# Patient Record
Sex: FEMALE | Race: WHITE | NOT HISPANIC OR LATINO | Employment: FULL TIME | URBAN - METROPOLITAN AREA
[De-identification: names, ages, dates, MRNs, and addresses within clinical notes are randomized per-mention and may not be internally consistent; named-entity substitution may affect disease eponyms.]

---

## 2024-05-24 ENCOUNTER — OFFICE VISIT (OUTPATIENT)
Age: 54
End: 2024-05-24
Payer: COMMERCIAL

## 2024-05-24 VITALS
DIASTOLIC BLOOD PRESSURE: 72 MMHG | HEIGHT: 63 IN | SYSTOLIC BLOOD PRESSURE: 118 MMHG | BODY MASS INDEX: 21.16 KG/M2 | WEIGHT: 119.4 LBS

## 2024-05-24 DIAGNOSIS — N92.1 MENORRHAGIA WITH IRREGULAR CYCLE: ICD-10-CM

## 2024-05-24 DIAGNOSIS — Z01.419 ENCOUNTER FOR ANNUAL ROUTINE GYNECOLOGICAL EXAMINATION: Primary | ICD-10-CM

## 2024-05-24 DIAGNOSIS — Z12.31 ENCOUNTER FOR SCREENING MAMMOGRAM FOR MALIGNANT NEOPLASM OF BREAST: ICD-10-CM

## 2024-05-24 PROCEDURE — S0610 ANNUAL GYNECOLOGICAL EXAMINA: HCPCS | Performed by: OBSTETRICS & GYNECOLOGY

## 2024-05-24 RX ORDER — LEVOTHYROXINE SODIUM 88 MCG
88 TABLET ORAL DAILY
COMMUNITY

## 2024-05-24 NOTE — PROGRESS NOTES
"Assessment/Plan:    1. Encounter for annual routine gynecological examination      2. Encounter for screening mammogram for malignant neoplasm of breast    - Mammo screening bilateral w 3d & cad; Future    3. Menorrhagia with irregular cycle    - US pelvis complete w transvaginal; Future      Subjective      Lisa Schoenly is a 54 y.o. female who presents for annual exam. Periods are irregular, lasting 2 weeks. She has a h/o CVA due to PFO - off of Plavix.  She is not currently sexually active.  No breast or urinary concerns.      Current contraception: abstinence  History of abnormal Pap smear: no  Regular self breast exam: yes  History of abnormal mammogram: no  History of abnormal lipids: no    Menstrual History:  OB History          2    Para   2    Term   2       0    AB   0    Living   2         SAB   0    IAB   0    Ectopic   0    Multiple   0    Live Births   2                Menarche age: 15  Patient's last menstrual period was 2024 (exact date).  Period Duration (Days): 10-14  Period Pattern: (!) Irregular  Menstrual Flow: Heavy, Light (Heavy x2-3 days with clots)  Menstrual Control: Thin pad, Tampon, Maxi pad  Menstrual Control Change Freq (Hours): 15min - 1 hr  Dysmenorrhea: (!) Moderate  Dysmenorrhea Symptoms: Cramping    Past Medical History:   Diagnosis Date    Hypothyroidism     Migraine     Stroke (HCC) 2022       Family History   Problem Relation Age of Onset    Thyroid disease Mother     No Known Problems Father     Colon cancer Maternal Uncle        The following portions of the patient's history were reviewed and updated as appropriate: allergies, current medications, past family history, past medical history, past social history, past surgical history, and problem list.    Review of Systems  Pertinent items are noted in HPI.      Objective      /72 (BP Location: Right arm, Patient Position: Sitting, Cuff Size: Standard)   Ht 5' 2.5\" (1.588 m)   Wt 54.2 kg (119 " lb 6.4 oz)   LMP 04/29/2024 (Exact Date)   BMI 21.49 kg/m²     General:   alert and oriented, in no acute distress   Heart:  Breasts: regular rate and rhythm  appear normal, no suspicious masses, no skin or nipple changes or axillary nodes.   Lungs: Effort normal   Abdomen: soft, non-tender, without masses or organomegaly   Vulva: normal   Vagina: normal mucosa   Cervix: no lesions   Uterus: normal size, mobile, non-tender   Adnexa: normal adnexa and no mass, fullness, tenderness

## 2024-06-07 ENCOUNTER — HOSPITAL ENCOUNTER (OUTPATIENT)
Dept: ULTRASOUND IMAGING | Facility: HOSPITAL | Age: 54
Discharge: HOME | End: 2024-06-07
Attending: OBSTETRICS & GYNECOLOGY
Payer: COMMERCIAL

## 2024-06-07 DIAGNOSIS — N92.1 MENORRHAGIA WITH IRREGULAR CYCLE: ICD-10-CM

## 2024-06-07 PROCEDURE — 76856 US EXAM PELVIC COMPLETE: CPT

## 2024-06-07 PROCEDURE — 76830 TRANSVAGINAL US NON-OB: CPT

## 2024-06-24 ENCOUNTER — TELEPHONE (OUTPATIENT)
Age: 54
End: 2024-06-24

## 2024-06-24 NOTE — TELEPHONE ENCOUNTER
Patient's call was in queue. She was already scheduled for an appointment to be evaluated.  Patient verbalized understanding and voiced appreciation for phone call.

## 2024-07-02 ENCOUNTER — CONSULT (OUTPATIENT)
Age: 54
End: 2024-07-02
Payer: COMMERCIAL

## 2024-07-02 VITALS — BODY MASS INDEX: 21.42 KG/M2 | DIASTOLIC BLOOD PRESSURE: 60 MMHG | SYSTOLIC BLOOD PRESSURE: 106 MMHG | WEIGHT: 119 LBS

## 2024-07-02 DIAGNOSIS — N92.1 MENORRHAGIA WITH IRREGULAR CYCLE: ICD-10-CM

## 2024-07-02 DIAGNOSIS — R93.5 ABNORMAL ULTRASOUND OF ENDOMETRIUM: Primary | ICD-10-CM

## 2024-07-02 PROCEDURE — 99213 OFFICE O/P EST LOW 20 MIN: CPT | Performed by: OBSTETRICS & GYNECOLOGY

## 2024-07-03 NOTE — PROGRESS NOTES
Gynecology   Lisa Schoenly 54 y.o. female MRN: [id number]      Assessment & Plan :    1. Abnormal ultrasound of endometrium  2. Menorrhagia with irregular cycle    Plan HSC myomectomy vs polypectomy.  Discussed risks of bleeding, infection, and perforation.  Will need preop clearance given stroke history.  Consent signed.      HPI:  Lisa Schoenly is a 54 y.o. female who presents with recent sono showing endometrial lesion consistent with a polyp or fibroid.  She has been experiencing heavy irregular VB for months.        Historical Information   Past Medical History:   Diagnosis Date    Hypothyroidism     Migraine     Stroke (HCC) 2022     Past Surgical History:   Procedure Laterality Date    CHOLECYSTECTOMY      THYROIDECTOMY      partial     OB/GYN History:   OB History          2    Para   2    Term   2       0    AB   0    Living   2         SAB   0    IAB   0    Ectopic   0    Multiple   0    Live Births   2                 Family History   Problem Relation Age of Onset    Thyroid disease Mother     No Known Problems Father     Colon cancer Maternal Uncle      Social History   Social History     Substance and Sexual Activity   Alcohol Use Yes    Alcohol/week: 4.0 standard drinks of alcohol    Types: 2 Glasses of wine, 2 Cans of beer per week    Comment: A month     Social History     Substance and Sexual Activity   Drug Use Never     Social History     Tobacco Use   Smoking Status Never   Smokeless Tobacco Never     E-Cigarette/Vaping    E-Cigarette Use Never User      E-Cigarette/Vaping Substances    Nicotine No     THC No     CBD No     Flavoring No     Other No     Unknown No        Meds/Allergies   Current Outpatient Medications on File Prior to Visit   Medication Sig    LOW-DOSE ASPIRIN PO Take 81 mg by mouth in the morning    Synthroid 88 MCG tablet Take 88 mcg by mouth daily     No current facility-administered medications on file prior to visit.       Allergies   Allergen Reactions     Latex Anaphylaxis, Angioedema and Cough     Patient has touched latex without incident its more if she smells. 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     Other reaction(s): scratchy throat    Charentais Melon (Colombian Melon) Itching     Scratchy throat per review of other MRN    Erythromycin GI Intolerance    Nuts - Food Allergy Itching     Scratchy throat, ear itching per other MRN    Other reaction(s): scratchy throat, itchy ears    Other Other (See Comments)     Melon-Scratchy throat    Pollen Extract Cough and Sneezing    Sulfa Antibiotics Rash     Review of Systems   Constitutional: Negative for chills, fever, malaise/fatigue and night sweats.   Gastrointestinal:  Negative for bloating, abdominal pain, nausea and vomiting.   Genitourinary:  Positive for menorrhagia. Negative for missed menses, non-menstrual bleeding and pelvic pain.   Neurological:  Negative for dizziness, headaches, light-headedness and weakness.         Objective   Vitals: Blood pressure 106/60, weight 54 kg (119 lb).    Physical Exam  Constitutional:       Appearance: Normal appearance.   HENT:      Head: Normocephalic.   Cardiovascular:      Rate and Rhythm: Normal rate and regular rhythm.   Pulmonary:      Effort: Pulmonary effort is normal.   Abdominal:      General: There is no distension.   Musculoskeletal:         General: No swelling.   Neurological:      General: No focal deficit present.      Mental Status: She is alert and oriented to person, place, and time.   Skin:     General: Skin is warm and dry.   Psychiatric:         Mood and Affect: Mood normal.         Behavior: Behavior normal.   Vitals reviewed.

## 2024-07-10 ENCOUNTER — TELEPHONE (OUTPATIENT)
Age: 54
End: 2024-07-10

## 2024-07-10 NOTE — TELEPHONE ENCOUNTER
----- Message from Vee Le MD sent at 7/3/2024  9:02 AM EDT -----  Regarding: Surgery  [facility] GYN Department  Surgery Scheduling Sheet    Patient Name: Lisa Schoenly  : 1970    Provider: Vee Le MD     Needed: no; Language: N/A    Procedure: exam under anesthesia, dilation and curettage , operative hysteroscopy, and myomectomy versus polypectomy    Diagnosis: Endometrial polyp versus fibroid; menorrhagia with irregular cycle    Special Needs or Equipment: Myosure    Anesthesia: IV sedation with anesthesia    Length of stay: outpatient    The patient has comorbid conditions that will require close perioperative monitoring prior to safe discharge, including h/o stroke.    This may require acute care beyond the usual and routine recovery period. As such, inpatient admission post-operatively is expected and appropriate, and anticipated hospital length of stay will be >2 midnights.    Pre-Admission Testing Needed: yes   Labs that should be ordered: NA    Order PAT that is recommended in prep for procedure?: Yes    Medical Clearance Needed: yes; Provider: PCP    MA Form Signed (tubals/hysterectomy): Not Indicated    Surgical Drink Given: no     How many days out of work: 2 day(s)     How many days no drivin day(s)       Is pre op appt needed?  no  Interval for post op appt: 2 week(s)     For Surgical Scheduler:     Surgery Scheduled On:  Given:     Pre-op Appt:   Post op Appt:  Consult/Medical clearance appt:

## 2024-09-16 ENCOUNTER — ANESTHESIA EVENT (OUTPATIENT)
Dept: PERIOP | Facility: HOSPITAL | Age: 54
End: 2024-09-16
Payer: COMMERCIAL

## 2024-09-16 RX ORDER — OMEGA-3-ACID ETHYL ESTERS 1 G/1
2 CAPSULE, LIQUID FILLED ORAL 2 TIMES DAILY
COMMUNITY

## 2024-09-16 RX ORDER — SACCHAROMYCES BOULARDII 250 MG
250 CAPSULE ORAL 2 TIMES DAILY
COMMUNITY

## 2024-09-16 RX ORDER — MULTIVITAMIN
1 TABLET ORAL DAILY
COMMUNITY

## 2024-09-16 NOTE — PRE-PROCEDURE INSTRUCTIONS
Pre-Surgery Instructions:   Medication Instructions    LOW-DOSE ASPIRIN PO Last dose 9-15-24 per MD    Multiple Vitamin (multivitamin) tablet Last dose 9-16-24    omega-3-acid ethyl esters (LOVAZA) 1 g capsule Last dose 9-16-    saccharomyces boulardii (FLORASTOR) 250 mg capsule Hold day of surgery.    Synthroid 88 MCG tablet Take day of surgery.   Medication instructions for day surgery reviewed. Please use only a sip of water to take your instructed medications. Avoid all over the counter vitamins, supplements and NSAIDS for one week prior to surgery per anesthesia guidelines. Tylenol is ok to take as needed.     You will receive a call one business day prior to surgery with an arrival time and hospital directions. If your surgery is scheduled on a Monday, the hospital will be calling you on the Friday prior to your surgery. If you have not heard from anyone by 8pm, please call the hospital supervisor through the hospital  at [phone] or [place] [phone]).    Do not eat or drink anything after midnight the night before your surgery, including candy, mints, lifesavers, or chewing gum. Do not drink alcohol 24hrs before your surgery. Try not to smoke at least 24hrs before your surgery.       Follow the pre surgery showering instructions as listed in the “My Surgical Experience Booklet” or otherwise provided by your surgeon's office. Do not use a blade to shave the surgical area 1 week before surgery. It is okay to use a clean electric clippers up to 24 hours before surgery. Do not apply any lotions, creams, including makeup, cologne, deodorant, or perfumes after showering on the day of your surgery. Do not use dry shampoo, hair spray, hair gel, or any type of hair products.     No contact lenses, eye make-up, or artificial eyelashes. Remove nail polish, including gel polish, and any artificial, gel, or acrylic nails if possible. Remove all jewelry including rings and body piercing jewelry.     Wear  causal clothing that is easy to take on and off. Consider your type of surgery.    Keep any valuables, jewelry, piercings at home. Please bring any specially ordered equipment (sling, braces) if indicated.    Arrange for a responsible person to drive you to and from the hospital on the day of your surgery. Please confirm the visitor policy for the day of your procedure when you receive your phone call with an arrival time.     Call the surgeon's office with any new illnesses, exposures, or additional questions prior to surgery.    Please reference your “My Surgical Experience Booklet” for additional information to prepare for your upcoming surgery.

## 2024-09-19 PROCEDURE — NC001 PR NO CHARGE: Performed by: OBSTETRICS & GYNECOLOGY

## 2024-09-19 NOTE — H&P
Gynecology   Lisa Schoenly 54 y.o. female MRN: [id number]           Assessment & Plan  :     1. Abnormal ultrasound of endometrium  2. Menorrhagia with irregular cycle     Plan HSC myomectomy vs polypectomy.  Discussed risks of bleeding, infection, and perforation.  Will need preop clearance given stroke history.  Consent signed.        HPI:  Lisa Schoenly is a 54 y.o. female who presents with recent sono showing endometrial lesion consistent with a polyp or fibroid.  She has been experiencing heavy irregular VB for months.                 Historical Information  Medical History        Past Medical History:   Diagnosis Date    Hypothyroidism      Migraine      Stroke (HCC) 2022         Surgical History         Past Surgical History:   Procedure Laterality Date    CHOLECYSTECTOMY        THYROIDECTOMY         partial         OB/GYN History:   OB History            2    Para   2    Term   2       0    AB   0    Living   2           SAB   0    IAB   0    Ectopic   0    Multiple   0    Live Births   2                     Family History         Family History   Problem Relation Age of Onset    Thyroid disease Mother      No Known Problems Father      Colon cancer Maternal Uncle                 Social History  Social History           Substance and Sexual Activity   Alcohol Use Yes    Alcohol/week: 4.0 standard drinks of alcohol    Types: 2 Glasses of wine, 2 Cans of beer per week     Comment: A month      Social History          Substance and Sexual Activity   Drug Use Never      Tobacco Use History   Social History          Tobacco Use   Smoking Status Never   Smokeless Tobacco Never               E-Cigarette/Vaping    E-Cigarette Use Never User              E-Cigarette/Vaping Substances    Nicotine No      THC No      CBD No      Flavoring No      Other No      Unknown No                 Meds/Allergies       Current Outpatient Medications on File Prior to Visit   Medication Sig    LOW-DOSE ASPIRIN  PO Take 81 mg by mouth in the morning    Synthroid 88 MCG tablet Take 88 mcg by mouth daily      No current facility-administered medications on file prior to visit.         Allergies         Allergies   Allergen Reactions    Latex Anaphylaxis, Angioedema and Cough       Patient has touched latex without incident its more if she smells. 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      Other reaction(s): scratchy throat    Charentais Melon (Malay Melon) Itching       Scratchy throat per review of other MRN    Erythromycin GI Intolerance    Nuts - Food Allergy Itching       Scratchy throat, ear itching per other MRN     Other reaction(s): scratchy throat, itchy ears    Other Other (See Comments)       Melon-Scratchy throat    Pollen Extract Cough and Sneezing    Sulfa Antibiotics Rash         Review of Systems   Constitutional: Negative for chills, fever, malaise/fatigue and night sweats.   Gastrointestinal:  Negative for bloating, abdominal pain, nausea and vomiting.   Genitourinary:  Positive for menorrhagia. Negative for missed menses, non-menstrual bleeding and pelvic pain.   Neurological:  Negative for dizziness, headaches, light-headedness and weakness.                  Objective  Vitals: Blood pressure 106/60, weight 54 kg (119 lb).     Physical Exam  Constitutional:       Appearance: Normal appearance.   HENT:      Head: Normocephalic.   Cardiovascular:      Rate and Rhythm: Normal rate and regular rhythm.   Pulmonary:      Effort: Pulmonary effort is normal.   Abdominal:      General: There is no distension.   Musculoskeletal:         General: No swelling.   Neurological:      General: No focal deficit present.      Mental Status: She is alert and oriented to person, place, and time.   Skin:     General: Skin is warm and dry.   Psychiatric:         Mood and Affect: Mood normal.         Behavior: Behavior normal.   Vitals reviewed.

## 2024-09-20 ENCOUNTER — HOSPITAL ENCOUNTER (OUTPATIENT)
Facility: HOSPITAL | Age: 54
Setting detail: OUTPATIENT SURGERY
Discharge: HOME | End: 2024-09-20
Attending: OBSTETRICS & GYNECOLOGY | Admitting: OBSTETRICS & GYNECOLOGY
Payer: COMMERCIAL

## 2024-09-20 ENCOUNTER — ANESTHESIA (OUTPATIENT)
Dept: PERIOP | Facility: HOSPITAL | Age: 54
End: 2024-09-20
Payer: COMMERCIAL

## 2024-09-20 VITALS
RESPIRATION RATE: 16 BRPM | HEIGHT: 63 IN | SYSTOLIC BLOOD PRESSURE: 109 MMHG | WEIGHT: 122 LBS | OXYGEN SATURATION: 96 % | BODY MASS INDEX: 21.62 KG/M2 | DIASTOLIC BLOOD PRESSURE: 72 MMHG | TEMPERATURE: 97.1 F | HEART RATE: 62 BPM

## 2024-09-20 DIAGNOSIS — D21.9 FIBROID: ICD-10-CM

## 2024-09-20 DIAGNOSIS — N84.0 ENDOMETRIAL POLYP: ICD-10-CM

## 2024-09-20 DIAGNOSIS — Z98.890 STATUS POST HYSTEROSCOPIC MYOMECTOMY: Primary | ICD-10-CM

## 2024-09-20 LAB
EXT PREGNANCY TEST URINE: NEGATIVE
EXT. CONTROL: NORMAL

## 2024-09-20 PROCEDURE — 88305 TISSUE EXAM BY PATHOLOGIST: CPT | Performed by: PATHOLOGY

## 2024-09-20 PROCEDURE — 58561 HYSTEROSCOPY REMOVE MYOMA: CPT | Performed by: OBSTETRICS & GYNECOLOGY

## 2024-09-20 PROCEDURE — NC001 PR NO CHARGE: Performed by: OBSTETRICS & GYNECOLOGY

## 2024-09-20 PROCEDURE — 81025 URINE PREGNANCY TEST: CPT | Performed by: OBSTETRICS & GYNECOLOGY

## 2024-09-20 RX ORDER — BUPIVACAINE HYDROCHLORIDE 5 MG/ML
INJECTION, SOLUTION EPIDURAL; INTRACAUDAL AS NEEDED
Status: DISCONTINUED | OUTPATIENT
Start: 2024-09-20 | End: 2024-09-20 | Stop reason: HOSPADM

## 2024-09-20 RX ORDER — FENTANYL CITRATE 50 UG/ML
INJECTION, SOLUTION INTRAMUSCULAR; INTRAVENOUS AS NEEDED
Status: DISCONTINUED | OUTPATIENT
Start: 2024-09-20 | End: 2024-09-20

## 2024-09-20 RX ORDER — MAGNESIUM HYDROXIDE 1200 MG/15ML
LIQUID ORAL AS NEEDED
Status: DISCONTINUED | OUTPATIENT
Start: 2024-09-20 | End: 2024-09-20 | Stop reason: HOSPADM

## 2024-09-20 RX ORDER — FENTANYL CITRATE/PF 50 MCG/ML
25 SYRINGE (ML) INJECTION
Status: DISCONTINUED | OUTPATIENT
Start: 2024-09-20 | End: 2024-09-20 | Stop reason: HOSPADM

## 2024-09-20 RX ORDER — SODIUM CHLORIDE, SODIUM LACTATE, POTASSIUM CHLORIDE, CALCIUM CHLORIDE 600; 310; 30; 20 MG/100ML; MG/100ML; MG/100ML; MG/100ML
INJECTION, SOLUTION INTRAVENOUS CONTINUOUS PRN
Status: DISCONTINUED | OUTPATIENT
Start: 2024-09-20 | End: 2024-09-20

## 2024-09-20 RX ORDER — ACETAMINOPHEN 500 MG
1000 TABLET ORAL EVERY 6 HOURS PRN
Start: 2024-09-20

## 2024-09-20 RX ORDER — DEXAMETHASONE SODIUM PHOSPHATE 10 MG/ML
INJECTION, SOLUTION INTRAMUSCULAR; INTRAVENOUS AS NEEDED
Status: DISCONTINUED | OUTPATIENT
Start: 2024-09-20 | End: 2024-09-20

## 2024-09-20 RX ORDER — PROPOFOL 10 MG/ML
INJECTION, EMULSION INTRAVENOUS CONTINUOUS PRN
Status: DISCONTINUED | OUTPATIENT
Start: 2024-09-20 | End: 2024-09-20

## 2024-09-20 RX ORDER — IBUPROFEN 600 MG/1
600 TABLET, FILM COATED ORAL EVERY 6 HOURS PRN
Start: 2024-09-20

## 2024-09-20 RX ORDER — EPHEDRINE SULFATE 50 MG/ML
INJECTION INTRAVENOUS AS NEEDED
Status: DISCONTINUED | OUTPATIENT
Start: 2024-09-20 | End: 2024-09-20

## 2024-09-20 RX ORDER — SODIUM CHLORIDE, SODIUM LACTATE, POTASSIUM CHLORIDE, CALCIUM CHLORIDE 600; 310; 30; 20 MG/100ML; MG/100ML; MG/100ML; MG/100ML
100 INJECTION, SOLUTION INTRAVENOUS CONTINUOUS
Status: DISCONTINUED | OUTPATIENT
Start: 2024-09-20 | End: 2024-09-20 | Stop reason: HOSPADM

## 2024-09-20 RX ORDER — ONDANSETRON 2 MG/ML
4 INJECTION INTRAMUSCULAR; INTRAVENOUS EVERY 4 HOURS PRN
Status: DISCONTINUED | OUTPATIENT
Start: 2024-09-20 | End: 2024-09-20 | Stop reason: HOSPADM

## 2024-09-20 RX ORDER — MIDAZOLAM HYDROCHLORIDE 2 MG/2ML
INJECTION, SOLUTION INTRAMUSCULAR; INTRAVENOUS AS NEEDED
Status: DISCONTINUED | OUTPATIENT
Start: 2024-09-20 | End: 2024-09-20

## 2024-09-20 RX ORDER — ONDANSETRON 2 MG/ML
INJECTION INTRAMUSCULAR; INTRAVENOUS AS NEEDED
Status: DISCONTINUED | OUTPATIENT
Start: 2024-09-20 | End: 2024-09-20

## 2024-09-20 RX ADMIN — SODIUM CHLORIDE, SODIUM LACTATE, POTASSIUM CHLORIDE, AND CALCIUM CHLORIDE: .6; .31; .03; .02 INJECTION, SOLUTION INTRAVENOUS at 08:46

## 2024-09-20 RX ADMIN — EPHEDRINE SULFATE 5 MG: 50 INJECTION INTRAVENOUS at 09:16

## 2024-09-20 RX ADMIN — FENTANYL CITRATE 25 MCG: 50 INJECTION, SOLUTION INTRAMUSCULAR; INTRAVENOUS at 09:57

## 2024-09-20 RX ADMIN — ONDANSETRON 4 MG: 2 INJECTION, SOLUTION INTRAMUSCULAR; INTRAVENOUS at 09:55

## 2024-09-20 RX ADMIN — DEXAMETHASONE SODIUM PHOSPHATE 10 MG: 10 INJECTION, SOLUTION INTRAMUSCULAR; INTRAVENOUS at 09:08

## 2024-09-20 RX ADMIN — EPHEDRINE SULFATE 5 MG: 50 INJECTION INTRAVENOUS at 09:28

## 2024-09-20 RX ADMIN — FENTANYL CITRATE 25 MCG: 50 INJECTION, SOLUTION INTRAMUSCULAR; INTRAVENOUS at 09:45

## 2024-09-20 RX ADMIN — PROPOFOL 100 MCG/KG/MIN: 10 INJECTION, EMULSION INTRAVENOUS at 09:09

## 2024-09-20 RX ADMIN — FENTANYL CITRATE 50 MCG: 50 INJECTION, SOLUTION INTRAMUSCULAR; INTRAVENOUS at 09:02

## 2024-09-20 RX ADMIN — MIDAZOLAM 2 MG: 1 INJECTION INTRAMUSCULAR; INTRAVENOUS at 08:58

## 2024-09-20 NOTE — OP NOTE
OPERATIVE REPORT  PATIENT NAME: Lisa Schoenly    :  1970  MRN: [id number]  Pt Location: [facility] OR ROOM 04    SURGERY DATE: 2024    Surgeons and Role:     * Vee Le MD - Primary    Preop Diagnosis:  Endometrial lesion  Menorrhagia with irregular cycle    Post-Op Diagnosis Codes:        * Fibroid [D21.9], submucosal    Procedure(s):  HYSTEROSCOPY MYOMECTOMY.  DILATION AND CURETTAGE. EXAM UNDER ANESTHESIA    Specimen(s):  ID Type Source Tests Collected by Time Destination   1 : myoma/[facility]C Tissue Myomectomy w/o Uterus TISSUE EXAM Vee Le MD 2024  9:48 AM        Estimated Blood Loss:   Minimal    Fluid Deficit:  350 mL    Anesthesia Type:   IV Sedation with Anesthesia plus paracervical block      Operative Findings:  2.5 cm submucosal fibroid arising from posterior ANALY  Normal cavity otherwise      Complications:   None    Procedure and Technique:    After the patient was identified she was taken to the OR where IV sedation was administered.  She was prepped and draped in the dorsal lithotomy position.  A timeout was performed with all team members.  The bladder was drained with a sterile catheter.  A weighted speculum was placed in the vagina and the anterior lip of the cervix grasped with a single-toothed tenaculum.  A total of 20 mL of 0.5% marcaine without epinephrine was used to perform a paracervical block.  The uterus was sounded and the cervix dilated to 23F with Paul dilators.  The Symphion was introduced with saline as a distension medium.  The endometrial cavity was inspected and the lesion identified.  The resector was placed and myomectomy performed with good visualization at all times. Sharp curettage of the entire uterine cavity was performed.  The tenaculum was removed and the bite sites found  to be hemostatic.  The speculum was removed.  All sponge, instrument and needle counts were correct at the end of the procedure.       I was present for the entire  procedure and a qualified resident physician was not available.    Patient Disposition:  PACU  and hemodynamically stable             SIGNATURE: Vee Le MD  DATE: September 20, 2024  TIME: 10:07 AM

## 2024-09-20 NOTE — ANESTHESIA PREPROCEDURE EVALUATION
"Procedure:  HYSTEROSCOPY MYOMECTOMY VERSUS POLYPECTOMY, DILATION AND CURETTAGE, EXAM UNDER ANESTHESIA (Uterus)    Relevant Problems   No relevant active problems      Past Medical History:   Diagnosis Date    Disease of thyroid gland     Hypothyroidism     Migraine     PFO (patent foramen ovale)     PONV (postoperative nausea and vomiting)     Stroke (HCC) 08/31/2022    some right sided weakness fine motor     No results found for: \"WBC\", \"HGB\", \"HCT\", \"MCV\", \"PLT\"    Physical Exam    Airway    Mallampati score: II  TM Distance: >3 FB  Neck ROM: full     Dental   No notable dental hx     Cardiovascular  Rhythm: regular, Rate: normal    Pulmonary   Breath sounds clear to auscultation    Other Findings  Intercisor Distance > 3cm    post-pubertal.      Anesthesia Plan  ASA Score- 3     Anesthesia Type- IV sedation with anesthesia with ASA Monitors.         Additional Monitors:     Airway Plan:     Comment: NPO appropriate. Discussed benefits/risks of monitored anesthetic care which involves providing a dynamic level of mild to deep sedation. Complications include awareness and/or airway obstruction/aspiration which may necessitate conversion to general anesthesia. All questions answered. Patient understands and wishes to proceed. .       Plan Factors-Exercise tolerance (METS): >4 METS.    Chart reviewed. EKG reviewed.  Existing labs reviewed.                   Induction-     Postoperative Plan- Plan for postoperative opioid use.         Informed Consent- Anesthetic plan and risks discussed with patient.  I personally reviewed this patient with the CRNA. Discussed and agreed on the Anesthesia Plan with the CRNA..        "

## 2024-09-20 NOTE — ANESTHESIA POSTPROCEDURE EVALUATION
Post-Op Assessment Note    CV Status:  Stable    Pain management: adequate       Mental Status:  Lethargic and sleepy   Hydration Status:  Stable   PONV Controlled:  None   Airway Patency:  Patent     Post Op Vitals Reviewed: Yes    No anethesia notable event occurred.    Staff: Anesthesiologist, CRNA   Comments: vss              BP   96/55   Temp   97.8   Pulse  54   Resp   14   SpO2   99

## 2024-09-25 PROCEDURE — 88305 TISSUE EXAM BY PATHOLOGIST: CPT | Performed by: PATHOLOGY

## 2024-09-26 ENCOUNTER — OFFICE VISIT (OUTPATIENT)
Age: 54
End: 2024-09-26

## 2024-09-26 VITALS
SYSTOLIC BLOOD PRESSURE: 120 MMHG | BODY MASS INDEX: 22.11 KG/M2 | DIASTOLIC BLOOD PRESSURE: 62 MMHG | WEIGHT: 124.8 LBS | HEIGHT: 63 IN

## 2024-09-26 DIAGNOSIS — Z48.89 POSTOPERATIVE VISIT: Primary | ICD-10-CM

## 2024-09-26 PROCEDURE — 99024 POSTOP FOLLOW-UP VISIT: CPT | Performed by: OBSTETRICS & GYNECOLOGY

## 2024-09-26 NOTE — PROGRESS NOTES
"Assessment:    S/p [facility] myomectomy  Doing well postoperatively.  Operative findings again reviewed. Pathology report discussed.      Plan:    1. Continue any current medications.  2. Wound care discussed.  3. Activity restrictions: none  4. Anticipated return to work: not applicable.  5. Follow up: May 2025 annual      Subjective     Lisa Schoenly is a 54 y.o. female who presents to the clinic 2 weeks status post  [facility] myomectomy  for fibroids. Eating a regular diet without difficulty. Bowel movements are normal. The patient is not having any pain.  Light spotting only.    The following portions of the patient's history were reviewed and updated as appropriate: allergies, current medications, past family history, past medical history, past social history, past surgical history, and problem list.    Review of Systems  Pertinent items are noted in HPI.      Objective     /62 (BP Location: Left arm, Patient Position: Sitting, Cuff Size: Large)   Ht 5' 2.5\" (1.588 m)   Wt 56.6 kg (124 lb 12.8 oz)   LMP 09/16/2024 (Exact Date)   BMI 22.46 kg/m²   General:  alert and oriented, in no acute distress   Abdomen: soft, non-tender   Pelvic:   Scant spotting, cervix closed, non-tender           "

## 2025-05-27 ENCOUNTER — ANNUAL EXAM (OUTPATIENT)
Age: 55
End: 2025-05-27

## 2025-05-27 VITALS
BODY MASS INDEX: 22.43 KG/M2 | WEIGHT: 126.6 LBS | SYSTOLIC BLOOD PRESSURE: 102 MMHG | DIASTOLIC BLOOD PRESSURE: 56 MMHG | HEIGHT: 63 IN

## 2025-05-27 DIAGNOSIS — Z01.419 ENCOUNTER FOR ANNUAL ROUTINE GYNECOLOGICAL EXAMINATION: Primary | ICD-10-CM

## 2025-05-27 DIAGNOSIS — Z12.31 ENCOUNTER FOR SCREENING MAMMOGRAM FOR MALIGNANT NEOPLASM OF BREAST: ICD-10-CM

## 2025-05-27 PROBLEM — I69.351 HEMIPLEGIA AND HEMIPARESIS FOLLOWING CEREBRAL INFARCTION AFFECTING RIGHT DOMINANT SIDE (HCC): Status: ACTIVE | Noted: 2023-01-09

## 2025-05-27 PROBLEM — F32.A DEPRESSION: Status: ACTIVE | Noted: 2022-09-01

## 2025-05-27 PROBLEM — Q24.9 CONGENITAL HEART DEFECT: Status: ACTIVE | Noted: 2022-08-31

## 2025-05-27 PROBLEM — I63.9 STROKE (HCC): Status: ACTIVE | Noted: 2022-08-31

## 2025-05-27 PROBLEM — F41.9 ANXIETY: Status: ACTIVE | Noted: 2022-09-01

## 2025-05-27 PROBLEM — Q21.12 PFO (PATENT FORAMEN OVALE): Status: ACTIVE | Noted: 2022-09-02

## 2025-05-27 PROBLEM — E89.0 H/O PARTIAL THYROIDECTOMY: Status: ACTIVE | Noted: 2024-05-22

## 2025-05-27 RX ORDER — UBIDECARENONE 30 MG
CAPSULE ORAL
COMMUNITY

## 2025-05-27 NOTE — PROGRESS NOTES
"Annual Wellness Visit  Name: Lisa Schoenly      : 1970      MRN: [id number]  Encounter Provider: Vee Le MD  Encounter Date: 2025   Encounter department: [facility]    55 y.o.  yo presents today for her annual exam.:  Assessment & Plan  Encounter for annual routine gynecological examination         Encounter for screening mammogram for malignant neoplasm of breast    Orders:  •  Mammo screening bilateral w 3d and cad; Future             History of Present Illness     Lisa Schoenly is a 55 y.o. female who presents for annual well woman exam.    GYN:  No vaginal discharge  Menses are irregular  Contraception: none.  Patient is not currently sexually active      OB:   female    :  No dysuria, urinary frequency or urgency.  No  hematuria, flank pain, incontinence.  No constipation, diarrhea    Breast:  Denies breast mass or skin changes  No breast discharge.  Patient does not have a family history of breast, endometrial, or ovarian ca.     General:  Diet: Reviewed dietary calcium recommendations  Exercise: Reviewed recommendation of 150 minutes of moderate intensity exercise per week  ETOH use: no  Tobacco use: no  Recreational drug use: no    Screening:  Last Pap: 23 NILM/HPV Neg   Last Mammogram: 24  BI-RADS 1, Neg   Cologard 23 Neg     Review of Systems as per HPI       Objective   /56 (BP Location: Left arm, Patient Position: Standing, Cuff Size: Standard)   Ht 5' 2.5\" (1.588 m)   Wt 57.4 kg (126 lb 9.6 oz)   LMP 2025 (Exact Date)   BMI 22.79 kg/m²      Physical Exam  Constitutional:       Appearance: Normal appearance.   Genitourinary:      Urethral meatus normal.      No vaginal discharge, erythema or bleeding.        Right Adnexa: not tender, not full and no mass present.     Left Adnexa: not tender, not full and no mass present.     No cervical discharge, friability or lesion.      Uterus is not enlarged, tender or " prolapsed.      No uterine mass detected.     Bladder is not tender.    Breasts:     Breasts are soft.     Right: No inverted nipple, mass, nipple discharge or skin change.      Left: No inverted nipple, mass, nipple discharge or skin change.   HENT:      Head: Normocephalic.     Cardiovascular:      Rate and Rhythm: Normal rate and regular rhythm.   Pulmonary:      Effort: Pulmonary effort is normal.   Abdominal:      General: There is no distension.      Palpations: Abdomen is soft.      Tenderness: There is no abdominal tenderness.     Musculoskeletal:         General: No swelling.   Lymphadenopathy:      Upper Body:      Right upper body: No axillary adenopathy.      Left upper body: No axillary adenopathy.     Neurological:      General: No focal deficit present.      Mental Status: She is alert and oriented to person, place, and time.     Skin:     General: Skin is warm and dry.     Psychiatric:         Mood and Affect: Mood normal.         Behavior: Behavior normal.   Vitals reviewed.

## 2025-06-26 DIAGNOSIS — Z83.2 FAMILY HISTORY OF PROTEIN S DEFICIENCY: ICD-10-CM

## 2025-06-26 DIAGNOSIS — I63.89 CEREBROVASCULAR ACCIDENT (CVA) DUE TO OTHER MECHANISM (HCC): Primary | ICD-10-CM

## 2025-07-03 ENCOUNTER — APPOINTMENT (OUTPATIENT)
Dept: LAB | Facility: CLINIC | Age: 55
End: 2025-07-03
Payer: COMMERCIAL

## 2025-07-03 DIAGNOSIS — Z83.2 FAMILY HISTORY OF PROTEIN S DEFICIENCY: ICD-10-CM

## 2025-07-03 DIAGNOSIS — I63.89 CEREBROVASCULAR ACCIDENT (CVA) DUE TO OTHER MECHANISM (HCC): ICD-10-CM

## 2025-07-03 PROCEDURE — 36415 COLL VENOUS BLD VENIPUNCTURE: CPT

## 2025-07-03 PROCEDURE — 85306 CLOT INHIBIT PROT S FREE: CPT

## 2025-07-03 PROCEDURE — 85305 CLOT INHIBIT PROT S TOTAL: CPT

## 2025-07-05 LAB
PROT S ACT/NOR PPP: 60 % (ref 61–136)
PROT S PPP-ACNC: 51 % (ref 60–150)

## 2025-07-07 LAB — PROT S ACT/NOR PPP: 51 % (ref 63–140)

## 2025-08-14 ENCOUNTER — TELEPHONE (OUTPATIENT)
Dept: NEUROLOGY | Facility: CLINIC | Age: 55
End: 2025-08-14

## 2025-08-24 PROBLEM — Z86.718 HISTORY OF DVT (DEEP VEIN THROMBOSIS): Status: ACTIVE | Noted: 2025-08-24

## (undated) DEVICE — PVC URETHRAL CATHETER: Brand: DOVER

## (undated) DEVICE — TISSUE REMOVAL SYSTEM RESECTING DEVICE: Brand: SYMPHION

## (undated) DEVICE — GLOVE INDICATOR PI UNDERGLOVE SZ 7.5 BLUE

## (undated) DEVICE — TISSUE REMOVAL SYSTEM FLUID MANAGEMENT ACCESSORIES: Brand: SYMPHION

## (undated) DEVICE — STRL ALLENTOWN HYSTEROSCOPY PK: Brand: CARDINAL HEALTH

## (undated) DEVICE — TUBING SUCTION 5MM X 12 FT

## (undated) DEVICE — GLOVE PI ULTRA TOUCH SZ.7.0

## (undated) DEVICE — CYSTO TUBING SINGLE IRRIGATION

## (undated) DEVICE — CHLORHEXIDINE 4PCT 4 OZ

## (undated) DEVICE — PREMIUM DRY TRAY LF: Brand: MEDLINE INDUSTRIES, INC.

## (undated) DEVICE — PAD SANITARY